# Patient Record
Sex: FEMALE | Race: WHITE | NOT HISPANIC OR LATINO | URBAN - METROPOLITAN AREA
[De-identification: names, ages, dates, MRNs, and addresses within clinical notes are randomized per-mention and may not be internally consistent; named-entity substitution may affect disease eponyms.]

---

## 2018-07-03 ENCOUNTER — APPOINTMENT (OUTPATIENT)
Dept: WOMENS IMAGING | Facility: HOSPITAL | Age: 48
End: 2018-07-03

## 2018-07-03 PROCEDURE — 77067 SCR MAMMO BI INCL CAD: CPT | Performed by: RADIOLOGY

## 2018-07-03 PROCEDURE — 77063 BREAST TOMOSYNTHESIS BI: CPT | Performed by: RADIOLOGY

## 2020-09-29 ENCOUNTER — APPOINTMENT (OUTPATIENT)
Dept: WOMENS IMAGING | Facility: HOSPITAL | Age: 50
End: 2020-09-29

## 2020-09-29 PROCEDURE — 77067 SCR MAMMO BI INCL CAD: CPT | Performed by: RADIOLOGY

## 2020-12-01 NOTE — PROGRESS NOTES
Subjective   Patient ID: Jovan Moncada is a 50 y.o. female is here today as a self referral for neck pain.  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  She is generally healthy although she is being treated for a foot and ankle problem at this time.  She has been quite active and about 2 months ago she was exercising doing a movement in which her neck was strained and she began having pain in the back of the neck between the shoulder blades but not down the arms.  No numbness, tingling or weakness in the arms.  Her range of motion was quite limited and poor initially.  She has a friend who is an orthopedic surgeon who gave her a steroid pack, some anti-inflammatories, and some muscle relaxants, and over time she has improved although she has some residual symptoms.  She has no motor deficits and her reflexes are normal.  I discussed with her the cervical MRI which showed basically the underlying mechanical reasons for her symptoms that being the degenerative disc disease.  I told her most of this is self-limiting and should get better but unfortunately it will be slow, maybe taking several more months.  I think formal physical therapy focusing on dry needling, electrostimulation and exercises will be helpful as well as continuation of Flexeril.  She said the anti-inflammatories really were not helping her that much nor did the pain medication.  Mainly she was reassured that I think that she will get better but if she develops some arm symptoms she will let me know.  We will do a TeleVisit in 3 months.  Injection therapies are a backup option.          History of Present Illness    The following portions of the patient's history were reviewed and updated as appropriate: allergies, current medications, past family history, past medical history, past social history, past surgical history and problem list.    Review of Systems   Constitutional: Negative for fever.    Respiratory: Negative.    Musculoskeletal: Positive for neck pain.   Neurological: Negative.    All other systems reviewed and are negative.      Objective   Physical Exam  Constitutional:       Appearance: She is well-developed.   HENT:      Head: Normocephalic and atraumatic.   Eyes:      Extraocular Movements: EOM normal.      Conjunctiva/sclera: Conjunctivae normal.      Pupils: Pupils are equal, round, and reactive to light.      Funduscopic exam:     Right eye: No papilledema.         Left eye: No papilledema.   Neck:      Vascular: No carotid bruit.   Neurological:      Mental Status: She is oriented to person, place, and time.      Coordination: Finger-Nose-Finger Test and Heel to Shin Test normal.      Gait: Gait is intact.      Deep Tendon Reflexes:      Reflex Scores:       Tricep reflexes are 2+ on the right side and 2+ on the left side.       Bicep reflexes are 2+ on the right side and 2+ on the left side.       Brachioradialis reflexes are 2+ on the right side and 2+ on the left side.       Patellar reflexes are 2+ on the right side and 2+ on the left side.       Achilles reflexes are 2+ on the right side and 2+ on the left side.  Psychiatric:         Speech: Speech normal.       Neurologic Exam     Mental Status   Oriented to person, place, and time.   Registration of memory: Good recent and remote memory.   Attention: normal. Concentration: normal.   Speech: speech is normal   Level of consciousness: alert  Knowledge: consistent with education.     Cranial Nerves     CN II   Visual fields full to confrontation.   Visual acuity: normal    CN III, IV, VI   Pupils are equal, round, and reactive to light.  Extraocular motions are normal.     CN V   Facial sensation intact.   Right corneal reflex: normal  Left corneal reflex: normal    CN VII   Facial expression full, symmetric.   Right facial weakness: none  Left facial weakness: none    CN VIII   Hearing: intact    CN IX, X   Palate: symmetric    CN  XI   Right sternocleidomastoid strength: normal  Left sternocleidomastoid strength: normal    CN XII   Tongue: not atrophic  Tongue deviation: none    Motor Exam   Muscle bulk: normal  Right arm tone: normal  Left arm tone: normal  Right leg tone: normal  Left leg tone: normal    Strength   Strength 5/5 except as noted.     Sensory Exam   Light touch normal.     Gait, Coordination, and Reflexes     Gait  Gait: normal    Coordination   Finger to nose coordination: normal  Heel to shin coordination: normal    Reflexes   Right brachioradialis: 2+  Left brachioradialis: 2+  Right biceps: 2+  Left biceps: 2+  Right triceps: 2+  Left triceps: 2+  Right patellar: 2+  Left patellar: 2+  Right achilles: 2+  Left achilles: 2+  Right : 2+  Left : 2+      Assessment/Plan   Independent Review of Radiographic Studies:      I reviewed the cervical MRI done earlier this month which showed some mild disc desiccation at C4-C5 and C5-C6 with mild disc bulging but no root impingement or cord impingement.  Agree with the report.      Medical Decision Making:    We will try some physical therapy focusing on exercises, e-stim, dry needling and some Flexeril and have her follow-up with a televisit in 3 months.  If she develops arm symptoms she will let us know.  I think most of this will be self-limiting but can take several more months to improve.      Diagnoses and all orders for this visit:    1. Degeneration of intervertebral disc at C4-C5 level (Primary)  -     Ambulatory Referral to Physical Therapy Evaluate and treat    2. Degeneration of intervertebral disc at C5-C6 level  -     Ambulatory Referral to Physical Therapy Evaluate and treat    3. Acute neck pain  -     Ambulatory Referral to Physical Therapy Evaluate and treat    Other orders  -     cyclobenzaprine (FLEXERIL) 10 MG tablet; Take 1 tablet by mouth 3 (Three) Times a Day As Needed for Muscle Spasms.  Dispense: 60 tablet; Refill: 3      Return in about 3 months  (around 3/14/2021) for Televisit.

## 2020-12-14 ENCOUNTER — OFFICE VISIT (OUTPATIENT)
Dept: NEUROSURGERY | Facility: CLINIC | Age: 50
End: 2020-12-14

## 2020-12-14 VITALS
HEIGHT: 65 IN | BODY MASS INDEX: 22.66 KG/M2 | DIASTOLIC BLOOD PRESSURE: 83 MMHG | SYSTOLIC BLOOD PRESSURE: 126 MMHG | WEIGHT: 136 LBS | HEART RATE: 76 BPM | RESPIRATION RATE: 18 BRPM

## 2020-12-14 DIAGNOSIS — M54.2 ACUTE NECK PAIN: ICD-10-CM

## 2020-12-14 DIAGNOSIS — M50.322 DEGENERATION OF INTERVERTEBRAL DISC AT C5-C6 LEVEL: ICD-10-CM

## 2020-12-14 DIAGNOSIS — M50.321 DEGENERATION OF INTERVERTEBRAL DISC AT C4-C5 LEVEL: Primary | ICD-10-CM

## 2020-12-14 PROCEDURE — 99244 OFF/OP CNSLTJ NEW/EST MOD 40: CPT | Performed by: NEUROLOGICAL SURGERY

## 2020-12-14 RX ORDER — CYCLOBENZAPRINE HCL 10 MG
10 TABLET ORAL 3 TIMES DAILY PRN
Qty: 60 TABLET | Refills: 3 | Status: SHIPPED | OUTPATIENT
Start: 2020-12-14 | End: 2022-03-08

## 2020-12-14 RX ORDER — CYCLOBENZAPRINE HCL 10 MG
10 TABLET ORAL DAILY PRN
COMMUNITY
Start: 2020-11-20 | End: 2022-03-08

## 2020-12-14 RX ORDER — DICLOFENAC SODIUM 75 MG/1
7 TABLET, DELAYED RELEASE ORAL DAILY
COMMUNITY
Start: 2020-12-08 | End: 2022-03-08

## 2020-12-14 RX ORDER — CHLORAL HYDRATE 500 MG
1000 CAPSULE ORAL AS NEEDED
COMMUNITY

## 2020-12-14 RX ORDER — VALACYCLOVIR HYDROCHLORIDE 1 G/1
1 TABLET, FILM COATED ORAL 2 TIMES DAILY PRN
COMMUNITY

## 2021-02-21 ENCOUNTER — IMMUNIZATION (OUTPATIENT)
Dept: VACCINE CLINIC | Facility: HOSPITAL | Age: 51
End: 2021-02-21

## 2021-02-21 PROCEDURE — 91300 HC SARSCOV02 VAC 30MCG/0.3ML IM: CPT | Performed by: INTERNAL MEDICINE

## 2021-02-21 PROCEDURE — 0001A: CPT | Performed by: INTERNAL MEDICINE

## 2021-03-14 ENCOUNTER — IMMUNIZATION (OUTPATIENT)
Dept: VACCINE CLINIC | Facility: HOSPITAL | Age: 51
End: 2021-03-14

## 2021-03-14 PROCEDURE — 0002A: CPT | Performed by: INTERNAL MEDICINE

## 2021-03-14 PROCEDURE — 91300 HC SARSCOV02 VAC 30MCG/0.3ML IM: CPT | Performed by: INTERNAL MEDICINE

## 2021-05-03 ENCOUNTER — TELEPHONE (OUTPATIENT)
Dept: NEUROSURGERY | Facility: CLINIC | Age: 51
End: 2021-05-03

## 2021-05-03 NOTE — TELEPHONE ENCOUNTER
Called and LVM regarding patient's telephone visit at 1:15pm, to give our office a call back. Dr. Be MD is running about 2hrs behind, wanted to go ahead and get it started so she is at least checked in and triaged.

## 2021-11-16 ENCOUNTER — OFFICE (OUTPATIENT)
Dept: URBAN - METROPOLITAN AREA CLINIC 66 | Facility: CLINIC | Age: 51
End: 2021-11-16

## 2021-11-16 VITALS
WEIGHT: 132 LBS | SYSTOLIC BLOOD PRESSURE: 100 MMHG | HEIGHT: 65 IN | HEART RATE: 62 BPM | DIASTOLIC BLOOD PRESSURE: 70 MMHG

## 2021-11-16 DIAGNOSIS — R14.2 ERUCTATION: ICD-10-CM

## 2021-11-16 DIAGNOSIS — R63.0 ANOREXIA: ICD-10-CM

## 2021-11-16 DIAGNOSIS — R10.9 UNSPECIFIED ABDOMINAL PAIN: ICD-10-CM

## 2021-11-16 DIAGNOSIS — K29.70 GASTRITIS, UNSPECIFIED, WITHOUT BLEEDING: ICD-10-CM

## 2021-11-16 DIAGNOSIS — R11.0 NAUSEA: ICD-10-CM

## 2021-11-16 PROCEDURE — 99204 OFFICE O/P NEW MOD 45 MIN: CPT | Performed by: NURSE PRACTITIONER

## 2022-02-10 ENCOUNTER — APPOINTMENT (OUTPATIENT)
Dept: WOMENS IMAGING | Facility: HOSPITAL | Age: 52
End: 2022-02-10

## 2022-02-10 PROCEDURE — 77063 BREAST TOMOSYNTHESIS BI: CPT | Performed by: RADIOLOGY

## 2022-02-10 PROCEDURE — 77067 SCR MAMMO BI INCL CAD: CPT | Performed by: RADIOLOGY

## 2022-03-04 ENCOUNTER — TRANSCRIBE ORDERS (OUTPATIENT)
Dept: ADMINISTRATIVE | Facility: HOSPITAL | Age: 52
End: 2022-03-04

## 2022-03-04 DIAGNOSIS — Z01.818 OTHER SPECIFIED PRE-OPERATIVE EXAMINATION: Primary | ICD-10-CM

## 2022-03-07 ENCOUNTER — OFFICE (OUTPATIENT)
Dept: URBAN - METROPOLITAN AREA CLINIC 66 | Facility: CLINIC | Age: 52
End: 2022-03-07

## 2022-03-07 VITALS
HEART RATE: 72 BPM | DIASTOLIC BLOOD PRESSURE: 85 MMHG | WEIGHT: 129 LBS | SYSTOLIC BLOOD PRESSURE: 135 MMHG | HEIGHT: 65 IN

## 2022-03-07 DIAGNOSIS — R19.7 DIARRHEA, UNSPECIFIED: ICD-10-CM

## 2022-03-07 DIAGNOSIS — R10.10 UPPER ABDOMINAL PAIN, UNSPECIFIED: ICD-10-CM

## 2022-03-07 PROCEDURE — 99214 OFFICE O/P EST MOD 30 MIN: CPT | Performed by: NURSE PRACTITIONER

## 2022-03-08 ENCOUNTER — LAB (OUTPATIENT)
Dept: LAB | Facility: HOSPITAL | Age: 52
End: 2022-03-08

## 2022-03-08 DIAGNOSIS — Z01.818 OTHER SPECIFIED PRE-OPERATIVE EXAMINATION: ICD-10-CM

## 2022-03-08 LAB — SARS-COV-2 ORF1AB RESP QL NAA+PROBE: NOT DETECTED

## 2022-03-08 PROCEDURE — U0004 COV-19 TEST NON-CDC HGH THRU: HCPCS

## 2022-03-08 PROCEDURE — C9803 HOPD COVID-19 SPEC COLLECT: HCPCS

## 2022-03-09 ENCOUNTER — ANESTHESIA (OUTPATIENT)
Dept: GASTROENTEROLOGY | Facility: HOSPITAL | Age: 52
End: 2022-03-09

## 2022-03-09 ENCOUNTER — HOSPITAL ENCOUNTER (OUTPATIENT)
Facility: HOSPITAL | Age: 52
Setting detail: HOSPITAL OUTPATIENT SURGERY
Discharge: HOME | End: 2022-03-09
Attending: INTERNAL MEDICINE | Admitting: INTERNAL MEDICINE

## 2022-03-09 ENCOUNTER — ON CAMPUS - OUTPATIENT (OUTPATIENT)
Dept: URBAN - METROPOLITAN AREA HOSPITAL 114 | Facility: HOSPITAL | Age: 52
End: 2022-03-09
Payer: COMMERCIAL

## 2022-03-09 ENCOUNTER — ANESTHESIA EVENT (OUTPATIENT)
Dept: GASTROENTEROLOGY | Facility: HOSPITAL | Age: 52
End: 2022-03-09

## 2022-03-09 VITALS
HEIGHT: 65 IN | WEIGHT: 123 LBS | DIASTOLIC BLOOD PRESSURE: 89 MMHG | HEART RATE: 64 BPM | RESPIRATION RATE: 16 BRPM | SYSTOLIC BLOOD PRESSURE: 121 MMHG | OXYGEN SATURATION: 100 % | BODY MASS INDEX: 20.49 KG/M2

## 2022-03-09 DIAGNOSIS — R19.7 DIARRHEA: ICD-10-CM

## 2022-03-09 DIAGNOSIS — R10.10 UPPER ABDOMINAL PAIN, UNSPECIFIED: ICD-10-CM

## 2022-03-09 DIAGNOSIS — R11.0 NAUSEA: ICD-10-CM

## 2022-03-09 DIAGNOSIS — K44.9 DIAPHRAGMATIC HERNIA WITHOUT OBSTRUCTION OR GANGRENE: ICD-10-CM

## 2022-03-09 DIAGNOSIS — K52.9 NONINFECTIVE GASTROENTERITIS AND COLITIS, UNSPECIFIED: ICD-10-CM

## 2022-03-09 DIAGNOSIS — R10.13 DYSPEPSIA: ICD-10-CM

## 2022-03-09 DIAGNOSIS — K29.50 UNSPECIFIED CHRONIC GASTRITIS WITHOUT BLEEDING: ICD-10-CM

## 2022-03-09 LAB
B-HCG UR QL: NEGATIVE
EXPIRATION DATE: NORMAL
INTERNAL NEGATIVE CONTROL: NORMAL
INTERNAL POSITIVE CONTROL: NORMAL
Lab: NORMAL

## 2022-03-09 PROCEDURE — 88305 TISSUE EXAM BY PATHOLOGIST: CPT | Performed by: INTERNAL MEDICINE

## 2022-03-09 PROCEDURE — 81025 URINE PREGNANCY TEST: CPT | Performed by: INTERNAL MEDICINE

## 2022-03-09 PROCEDURE — 45380 COLONOSCOPY AND BIOPSY: CPT | Performed by: INTERNAL MEDICINE

## 2022-03-09 PROCEDURE — 25010000002 PROPOFOL 10 MG/ML EMULSION: Performed by: ANESTHESIOLOGY

## 2022-03-09 PROCEDURE — 87071 CULTURE AEROBIC QUANT OTHER: CPT | Performed by: INTERNAL MEDICINE

## 2022-03-09 PROCEDURE — 43239 EGD BIOPSY SINGLE/MULTIPLE: CPT | Performed by: INTERNAL MEDICINE

## 2022-03-09 RX ORDER — PROPOFOL 10 MG/ML
VIAL (ML) INTRAVENOUS AS NEEDED
Status: DISCONTINUED | OUTPATIENT
Start: 2022-03-09 | End: 2022-03-09 | Stop reason: SURG

## 2022-03-09 RX ORDER — SODIUM CHLORIDE, SODIUM LACTATE, POTASSIUM CHLORIDE, CALCIUM CHLORIDE 600; 310; 30; 20 MG/100ML; MG/100ML; MG/100ML; MG/100ML
30 INJECTION, SOLUTION INTRAVENOUS CONTINUOUS PRN
Status: DISCONTINUED | OUTPATIENT
Start: 2022-03-09 | End: 2022-03-09 | Stop reason: HOSPADM

## 2022-03-09 RX ORDER — LIDOCAINE HYDROCHLORIDE 20 MG/ML
INJECTION, SOLUTION INFILTRATION; PERINEURAL AS NEEDED
Status: DISCONTINUED | OUTPATIENT
Start: 2022-03-09 | End: 2022-03-09 | Stop reason: SURG

## 2022-03-09 RX ADMIN — PROPOFOL 100 MG: 10 INJECTION, EMULSION INTRAVENOUS at 11:16

## 2022-03-09 RX ADMIN — SODIUM CHLORIDE, POTASSIUM CHLORIDE, SODIUM LACTATE AND CALCIUM CHLORIDE 30 ML/HR: 600; 310; 30; 20 INJECTION, SOLUTION INTRAVENOUS at 10:26

## 2022-03-09 RX ADMIN — LIDOCAINE HYDROCHLORIDE 60 MG: 20 INJECTION, SOLUTION INFILTRATION; PERINEURAL at 11:16

## 2022-03-09 RX ADMIN — PROPOFOL 100 MCG/KG/MIN: 10 INJECTION, EMULSION INTRAVENOUS at 11:17

## 2022-03-09 NOTE — H&P
[facility]   HISTORY AND PHYSICAL    Patient Name: Jovan Moncada  : 1970  MRN: [id number]  Primary Care Physician:  Provider, No Known  Date of admission: 3/9/2022    Subjective   Subjective     Chief Complaint: diarrhea, nausea,    History of Present Illness  Some weight loss, flagyl was helpful did have stool positive for protozoa. No bleeding  Review of Systems   Gastrointestinal: Positive for diarrhea and nausea.   All other systems reviewed and are negative.       Personal History     Past Medical History:   Diagnosis Date   • Abnormal stools     FLOATING   • History of giardia infection     TREATED WITH FLAGYL   • Nausea & vomiting    • Neck pain        Past Surgical History:   Procedure Laterality Date   •  SECTION     • COLONOSCOPY         Family History: family history includes Cancer in her mother; Heart disease in her father, paternal grandfather, and paternal grandmother; Hyperlipidemia in her brother and father; Hypertension in her father. Otherwise pertinent FHx was reviewed and not pertinent to current issue.    Social History:  reports that she has never smoked. She has never used smokeless tobacco. She reports previous alcohol use of about 1.0 standard drink of alcohol per week. She reports that she does not use drugs.    Home Medications:  Cholecalciferol, cyanocobalamin, fish oil, and valACYclovir    Allergies:  No Known Allergies    Objective    Objective     Vitals:   Heart Rate:  [53] 53  Resp:  [11] 11  BP: (126)/(87) 126/87    Physical Exam  HENT:      Right Ear: External ear normal.      Left Ear: External ear normal.      Mouth/Throat:      Pharynx: Oropharynx is clear.   Eyes:      Conjunctiva/sclera: Conjunctivae normal.   Cardiovascular:      Rate and Rhythm: Normal rate.      Pulses: Normal pulses.   Pulmonary:      Effort: Pulmonary effort is normal.   Abdominal:      General: Abdomen is flat.   Skin:     General: Skin is warm and dry.   Neurological:      General:  No focal deficit present.      Mental Status: She is alert.   Psychiatric:         Mood and Affect: Mood normal.         Result Review    Result Review:  I have personally reviewed the results from the time of this admission to 3/9/2022 11:09 EST and agree with these findings:  []  Laboratory  []  Microbiology  []  Radiology  []  EKG/Telemetry   []  Cardiology/Vascular   []  Pathology  []  Old records  []  Other:    Assessment/Plan   Assessment / Plan     Brief Patient Summary:  Jovan Moncada is a 51 y.o. female   Nausea,  Diarrhea  Positive fecal calprotectin   Active Hospital Problems:  There are no active hospital problems to display for this patient.    Plan: Upper and lower tract endoscopy, risks, alternatives and benefits dicussed  with patient and patient is agreeable to proceed.      DVT prophylaxis:  No DVT prophylaxis order currently exists.    CODE STATUS:       Admission Status:  I believe this patient meets outpatient status.    Hans Santillan MD

## 2022-03-09 NOTE — ANESTHESIA POSTPROCEDURE EVALUATION
Patient: Jovan Moncada    Procedure Summary     Date: 03/09/22 Room / Location:  [facility]    Anesthesia Start: 1105 Anesthesia Stop: 1143    Procedures:       ESOPHAGOGASTRODUODENOSCOPY with duodenal aspirate (N/A Esophagus)      COLONOSCOPY into cecum/terminal ileum with biopsy (N/A ) Diagnosis:     Surgeons: Hans Santillan MD Provider: Kassie Srivastava MD    Anesthesia Type: MAC ASA Status: 2          Anesthesia Type: MAC    Vitals  No vitals data found for the desired time range.          Post Anesthesia Care and Evaluation    Patient location during evaluation: bedside  Patient participation: complete - patient participated  Level of consciousness: awake  Pain management: adequate  Airway patency: patent  Anesthetic complications: No anesthetic complications    Cardiovascular status: acceptable  Respiratory status: acceptable  Hydration status: acceptable

## 2022-03-09 NOTE — ANESTHESIA PREPROCEDURE EVALUATION
Anesthesia Evaluation                  Airway   Mallampati: I  TM distance: >3 FB  Neck ROM: full  No difficulty expected  Dental - normal exam     Pulmonary - normal exam   Cardiovascular - normal exam        Neuro/Psych  GI/Hepatic/Renal/Endo      Musculoskeletal     (+) neck pain,   Abdominal  - normal exam    Bowel sounds: normal.   Substance History      OB/GYN          Other   arthritis,                      Anesthesia Plan    ASA 2     MAC     intravenous induction     Anesthetic plan, all risks, benefits, and alternatives have been provided, discussed and informed consent has been obtained with: patient.        CODE STATUS:

## 2022-03-10 LAB
LAB AP CASE REPORT: NORMAL
PATH REPORT.FINAL DX SPEC: NORMAL
PATH REPORT.GROSS SPEC: NORMAL

## 2022-03-11 LAB — BACTERIA SPEC AEROBE CULT: NORMAL

## 2022-03-22 ENCOUNTER — OFFICE (OUTPATIENT)
Dept: URBAN - METROPOLITAN AREA CLINIC 66 | Facility: CLINIC | Age: 52
End: 2022-03-22

## 2022-03-22 VITALS
SYSTOLIC BLOOD PRESSURE: 112 MMHG | HEART RATE: 65 BPM | HEIGHT: 65 IN | WEIGHT: 130 LBS | DIASTOLIC BLOOD PRESSURE: 70 MMHG

## 2022-03-22 DIAGNOSIS — R10.13 EPIGASTRIC PAIN: ICD-10-CM

## 2022-03-22 DIAGNOSIS — R11.0 NAUSEA: ICD-10-CM

## 2022-03-22 PROCEDURE — 99213 OFFICE O/P EST LOW 20 MIN: CPT | Performed by: NURSE PRACTITIONER

## 2022-03-22 RX ORDER — DICYCLOMINE HYDROCHLORIDE 10 MG/1
40 CAPSULE ORAL
Qty: 60 | Refills: 3 | Status: ACTIVE
Start: 2022-03-22

## 2023-01-20 ENCOUNTER — TELEPHONE (OUTPATIENT)
Dept: NEUROSURGERY | Facility: CLINIC | Age: 53
End: 2023-01-20
Payer: COMMERCIAL

## 2023-01-20 NOTE — TELEPHONE ENCOUNTER
==    Caller: Jovan Moncada    Relationship to patient: Self    Best call back number: [phone]    Chief complaint:     Type of visit: FOLLOW UP EXTENDED     Requested date: 1/20/23    If rescheduling, when is the original appointment: NA    Additional notes:    PATIENT CALLED AND WOULD LIKE TO COME BACK IN THE OFFICE - DID NOT FOLLOW PLAN OF CARE IN 2020 -  PATIENT HUNG UP WHEN I WAS ASKING ADDITIONAL QUESTIONS - JUST REMEMBER HER SAYING SHE IS HAVING SEVERE NECK PAIN AND CERVICAL PAIN AS WELL -  PLEASE CALL BACK TO ADVICE ON FOLLOW UP - THANK YOU!

## 2023-02-10 ENCOUNTER — TELEPHONE (OUTPATIENT)
Dept: NEUROSURGERY | Facility: CLINIC | Age: 53
End: 2023-02-10
Payer: COMMERCIAL

## 2023-02-10 NOTE — TELEPHONE ENCOUNTER
Caller: Jovan Moncada    Relationship to patient: Self    Best call back number:    [phone]        Patient is needing: PATIENT IS CALLING REGARDING TO CERVICAL NECK PAIN - GOING DOWN THE CENTER OF BACK AND ALL MUSCLES ARE ACHING AND HARD TO TAKE A DEEP BREATH    PATIENTS STATES THAT SHE IS UNABLE TO SLEEP WELL, NO MEDICATION IS HELPING AT THIS TIME, HEAT AND ICE DO NOT HELP EITHER.    PATIENT IS CALLING TO SCHEDULE FOLLOW UP  PATIENT STATES IT IS WORSE AT NIGHT    PATIENT WAS LAST SEEN 12.14.20    PLEASE GIVE PATIENT TO SCHEDULE

## 2023-02-13 NOTE — TELEPHONE ENCOUNTER
PATIENT CALLED TO CONFIRM HER APPT WAS CANCELLED. APPT HAS BEEN CANCELLED, PLEASE CALL PATIENT TO RESCHEDULE.    THANK YOU

## (undated) DEVICE — LN SMPL CO2 SHTRM SD STREAM W/M LUER

## (undated) DEVICE — TUBING, SUCTION, 1/4" X 10', STRAIGHT: Brand: MEDLINE

## (undated) DEVICE — KT ORCA ORCAPOD DISP STRL

## (undated) DEVICE — SENSR O2 OXIMAX FNGR A/ 18IN NONSTR

## (undated) DEVICE — MSK PROC CURAPLEX O2 2/ADAPT 7FT

## (undated) DEVICE — CATH ASP DUODENAL 2.5MM 180CM

## (undated) DEVICE — BITEBLOCK OMNI BLOC

## (undated) DEVICE — ADAPT CLN BIOGUARD AIR/H2O DISP